# Patient Record
Sex: MALE | Race: BLACK OR AFRICAN AMERICAN | NOT HISPANIC OR LATINO | URBAN - METROPOLITAN AREA
[De-identification: names, ages, dates, MRNs, and addresses within clinical notes are randomized per-mention and may not be internally consistent; named-entity substitution may affect disease eponyms.]

---

## 2020-01-07 ENCOUNTER — HISTORICAL (OUTPATIENT)
Dept: CARDIOLOGY | Facility: HOSPITAL | Age: 45
End: 2020-01-07

## 2020-03-05 ENCOUNTER — HISTORICAL (OUTPATIENT)
Dept: RADIOLOGY | Facility: HOSPITAL | Age: 45
End: 2020-03-05

## 2020-03-12 ENCOUNTER — HISTORICAL (OUTPATIENT)
Dept: RADIOLOGY | Facility: HOSPITAL | Age: 45
End: 2020-03-12

## 2020-05-20 ENCOUNTER — HISTORICAL (OUTPATIENT)
Dept: RADIOLOGY | Facility: HOSPITAL | Age: 45
End: 2020-05-20

## 2021-03-03 ENCOUNTER — HISTORICAL (OUTPATIENT)
Dept: INFECTIOUS DISEASES | Facility: HOSPITAL | Age: 46
End: 2021-03-03

## 2022-08-10 DIAGNOSIS — D44.0 TERATOMA OF UNCERTAIN BEHAVIOR OF THYROID GLAND: Primary | ICD-10-CM

## 2022-10-25 ENCOUNTER — LAB VISIT (OUTPATIENT)
Dept: LAB | Facility: HOSPITAL | Age: 47
End: 2022-10-25
Attending: SURGERY
Payer: MEDICAID

## 2022-10-25 ENCOUNTER — CLINICAL SUPPORT (OUTPATIENT)
Dept: RESPIRATORY THERAPY | Facility: HOSPITAL | Age: 47
End: 2022-10-25
Attending: SURGERY
Payer: MEDICAID

## 2022-10-25 DIAGNOSIS — K62.5 HEMORRHAGE OF RECTUM AND ANUS: Primary | ICD-10-CM

## 2022-10-25 DIAGNOSIS — K62.5 HEMORRHAGE OF RECTUM AND ANUS: ICD-10-CM

## 2022-10-25 LAB
ALBUMIN SERPL-MCNC: 4 GM/DL (ref 3.5–5)
ALBUMIN/GLOB SERPL: 1.1 RATIO (ref 1.1–2)
ALP SERPL-CCNC: 82 UNIT/L (ref 40–150)
ALT SERPL-CCNC: 29 UNIT/L (ref 0–55)
APTT PPP: 26.4 SECONDS (ref 23.2–33.7)
AST SERPL-CCNC: 18 UNIT/L (ref 5–34)
BASOPHILS # BLD AUTO: 0.02 X10(3)/MCL (ref 0–0.2)
BASOPHILS NFR BLD AUTO: 0.3 %
BILIRUBIN DIRECT+TOT PNL SERPL-MCNC: 0.3 MG/DL
BUN SERPL-MCNC: 12 MG/DL (ref 8.9–20.6)
CALCIUM SERPL-MCNC: 9.7 MG/DL (ref 8.4–10.2)
CHLORIDE SERPL-SCNC: 103 MMOL/L (ref 98–107)
CO2 SERPL-SCNC: 28 MMOL/L (ref 22–29)
CREAT SERPL-MCNC: 0.96 MG/DL (ref 0.73–1.18)
EOSINOPHIL # BLD AUTO: 0.3 X10(3)/MCL (ref 0–0.9)
EOSINOPHIL NFR BLD AUTO: 4.3 %
ERYTHROCYTE [DISTWIDTH] IN BLOOD BY AUTOMATED COUNT: 13.6 % (ref 11.5–17)
GFR SERPLBLD CREATININE-BSD FMLA CKD-EPI: >60 MLS/MIN/1.73/M2
GLOBULIN SER-MCNC: 3.5 GM/DL (ref 2.4–3.5)
GLUCOSE SERPL-MCNC: 166 MG/DL (ref 74–100)
HCT VFR BLD AUTO: 41.6 % (ref 42–52)
HGB BLD-MCNC: 14.4 GM/DL (ref 14–18)
IMM GRANULOCYTES # BLD AUTO: 0.03 X10(3)/MCL (ref 0–0.04)
IMM GRANULOCYTES NFR BLD AUTO: 0.4 %
INR BLD: 0.98 (ref 0–1.3)
LYMPHOCYTES # BLD AUTO: 2.33 X10(3)/MCL (ref 0.6–4.6)
LYMPHOCYTES NFR BLD AUTO: 33.3 %
MCH RBC QN AUTO: 31.2 PG (ref 27–31)
MCHC RBC AUTO-ENTMCNC: 34.6 MG/DL (ref 33–36)
MCV RBC AUTO: 90.2 FL (ref 80–94)
MONOCYTES # BLD AUTO: 0.41 X10(3)/MCL (ref 0.1–1.3)
MONOCYTES NFR BLD AUTO: 5.9 %
NEUTROPHILS # BLD AUTO: 3.9 X10(3)/MCL (ref 2.1–9.2)
NEUTROPHILS NFR BLD AUTO: 55.8 %
PLATELET # BLD AUTO: 249 X10(3)/MCL (ref 130–400)
PMV BLD AUTO: 9.4 FL (ref 7.4–10.4)
POTASSIUM SERPL-SCNC: 3.8 MMOL/L (ref 3.5–5.1)
PROT SERPL-MCNC: 7.5 GM/DL (ref 6.4–8.3)
PROTHROMBIN TIME: 12.9 SECONDS (ref 12.5–14.5)
RBC # BLD AUTO: 4.61 X10(6)/MCL (ref 4.7–6.1)
SODIUM SERPL-SCNC: 140 MMOL/L (ref 136–145)
WBC # SPEC AUTO: 7 X10(3)/MCL (ref 4.5–11.5)

## 2022-10-25 PROCEDURE — 93010 EKG 12-LEAD: ICD-10-PCS | Mod: ,,, | Performed by: INTERNAL MEDICINE

## 2022-10-25 PROCEDURE — 93010 ELECTROCARDIOGRAM REPORT: CPT | Mod: ,,, | Performed by: INTERNAL MEDICINE

## 2022-10-25 PROCEDURE — 80053 COMPREHEN METABOLIC PANEL: CPT

## 2022-10-25 PROCEDURE — 93005 ELECTROCARDIOGRAM TRACING: CPT

## 2022-10-25 PROCEDURE — 85025 COMPLETE CBC W/AUTO DIFF WBC: CPT

## 2022-10-25 PROCEDURE — 36415 COLL VENOUS BLD VENIPUNCTURE: CPT

## 2022-10-25 PROCEDURE — 85730 THROMBOPLASTIN TIME PARTIAL: CPT

## 2022-10-25 PROCEDURE — 85610 PROTHROMBIN TIME: CPT

## 2022-10-25 RX ORDER — PRAVASTATIN SODIUM 40 MG/1
40 TABLET ORAL NIGHTLY
COMMUNITY
Start: 2022-09-26

## 2022-10-25 RX ORDER — TIZANIDINE 4 MG/1
TABLET ORAL
COMMUNITY
Start: 2022-10-04

## 2022-10-25 RX ORDER — HYDROCODONE BITARTRATE AND ACETAMINOPHEN 10; 325 MG/1; MG/1
TABLET ORAL
COMMUNITY
Start: 2022-10-04

## 2022-10-25 RX ORDER — HYDROCHLOROTHIAZIDE 25 MG/1
25 TABLET ORAL EVERY MORNING
COMMUNITY
Start: 2022-09-26

## 2022-10-25 RX ORDER — OLMESARTAN MEDOXOMIL 40 MG/1
40 TABLET ORAL EVERY MORNING
COMMUNITY
Start: 2022-09-26

## 2022-10-25 RX ORDER — AMLODIPINE BESYLATE 10 MG/1
10 TABLET ORAL EVERY MORNING
COMMUNITY
Start: 2022-09-26

## 2022-10-25 RX ORDER — ASPIRIN 81 MG/1
81 TABLET ORAL EVERY MORNING
COMMUNITY
Start: 2022-08-09

## 2022-10-25 RX ORDER — LORATADINE 10 MG/1
10 TABLET ORAL EVERY MORNING
COMMUNITY
Start: 2022-09-22

## 2022-10-25 RX ORDER — LISINOPRIL 20 MG/1
20 TABLET ORAL EVERY MORNING
COMMUNITY
Start: 2022-10-08

## 2022-10-25 NOTE — DISCHARGE INSTRUCTIONS
Follow prep on Wednesday. Nothing to drink after midnight. Take Lisinopril, Amlodipine and Olmesartan AM of procedure with small sip of water.

## 2022-10-26 ENCOUNTER — ANESTHESIA EVENT (OUTPATIENT)
Dept: SURGERY | Facility: HOSPITAL | Age: 47
End: 2022-10-26
Payer: MEDICAID

## 2022-10-26 NOTE — ANESTHESIA PREPROCEDURE EVALUATION
10/26/2022  Arnaldo Gomes is a 46 y.o., male.      Pre-op Assessment    I have reviewed the Patient Summary Reports.     I have reviewed the Nursing Notes. I have reviewed the NPO Status.   I have reviewed the Medications.     Review of Systems  Anesthesia Hx:  Denies Family Hx of Anesthesia complications.   Denies Personal Hx of Anesthesia complications.   Social:  Smoker, No Alcohol Use    Hematology/Oncology:  Hematology Normal   Oncology Normal     EENT/Dental:EENT/Dental Normal   Cardiovascular:   Hypertension, well controlled ECG has been reviewed.    Pulmonary:  Pulmonary Normal    Renal/:  Renal/ Normal     Hepatic/GI:  Hepatic/GI Normal    Musculoskeletal:  Musculoskeletal Normal    Neurological:  Neurology Normal    Endocrine:   Diabetes, well controlled, type 2    Dermatological:  Skin Normal    Psych:  Psychiatric Normal           Physical Exam  General: Cooperative, Alert and Oriented    Airway:  Mallampati: II   Mouth Opening: Normal  TM Distance: Normal  Neck ROM: Normal ROM    Dental:  Intact        Anesthesia Plan  Type of Anesthesia, risks & benefits discussed:    Anesthesia Type: Gen Natural Airway  Intra-op Monitoring Plan: Standard ASA Monitors  Post Op Pain Control Plan:   (medical reason for not using multimodal pain management)  Induction:  IV  Informed Consent: Patient consented to blood products? Yes  ASA Score: 3    Ready For Surgery From Anesthesia Perspective.     .

## 2022-10-27 ENCOUNTER — ANESTHESIA (OUTPATIENT)
Dept: SURGERY | Facility: HOSPITAL | Age: 47
End: 2022-10-27
Payer: MEDICAID

## 2022-10-27 ENCOUNTER — HOSPITAL ENCOUNTER (OUTPATIENT)
Facility: HOSPITAL | Age: 47
Discharge: HOME | End: 2022-10-27
Attending: SURGERY | Admitting: SURGERY
Payer: MEDICAID

## 2022-10-27 VITALS
WEIGHT: 262 LBS | BODY MASS INDEX: 34.72 KG/M2 | SYSTOLIC BLOOD PRESSURE: 152 MMHG | RESPIRATION RATE: 20 BRPM | HEART RATE: 83 BPM | TEMPERATURE: 98 F | OXYGEN SATURATION: 97 % | HEIGHT: 73 IN | DIASTOLIC BLOOD PRESSURE: 94 MMHG

## 2022-10-27 DIAGNOSIS — Z12.11 SCREENING FOR COLON CANCER: ICD-10-CM

## 2022-10-27 LAB — POCT GLUCOSE: 104 MG/DL (ref 70–110)

## 2022-10-27 PROCEDURE — 25000003 PHARM REV CODE 250: Performed by: NURSE ANESTHETIST, CERTIFIED REGISTERED

## 2022-10-27 PROCEDURE — 45378 DIAGNOSTIC COLONOSCOPY: CPT | Performed by: SURGERY

## 2022-10-27 PROCEDURE — 37000008 HC ANESTHESIA 1ST 15 MINUTES: Performed by: SURGERY

## 2022-10-27 PROCEDURE — 00811 ANES LWR INTST NDSC NOS: CPT | Performed by: SURGERY

## 2022-10-27 PROCEDURE — 37000009 HC ANESTHESIA EA ADD 15 MINS: Performed by: SURGERY

## 2022-10-27 PROCEDURE — 63600175 PHARM REV CODE 636 W HCPCS: Performed by: ANESTHESIOLOGY

## 2022-10-27 PROCEDURE — 63600175 PHARM REV CODE 636 W HCPCS: Performed by: NURSE ANESTHETIST, CERTIFIED REGISTERED

## 2022-10-27 RX ORDER — PROPOFOL 10 MG/ML
VIAL (ML) INTRAVENOUS
Status: DISCONTINUED | OUTPATIENT
Start: 2022-10-27 | End: 2022-10-27

## 2022-10-27 RX ORDER — LIDOCAINE HYDROCHLORIDE 20 MG/ML
INJECTION, SOLUTION EPIDURAL; INFILTRATION; INTRACAUDAL; PERINEURAL
Status: DISCONTINUED | OUTPATIENT
Start: 2022-10-27 | End: 2022-10-27

## 2022-10-27 RX ORDER — SODIUM CHLORIDE, SODIUM LACTATE, POTASSIUM CHLORIDE, CALCIUM CHLORIDE 600; 310; 30; 20 MG/100ML; MG/100ML; MG/100ML; MG/100ML
INJECTION, SOLUTION INTRAVENOUS CONTINUOUS
Status: DISCONTINUED | OUTPATIENT
Start: 2022-10-27 | End: 2022-10-27 | Stop reason: HOSPADM

## 2022-10-27 RX ADMIN — PROPOFOL 30 MG: 10 INJECTION, EMULSION INTRAVENOUS at 10:10

## 2022-10-27 RX ADMIN — PROPOFOL 150 MG: 10 INJECTION, EMULSION INTRAVENOUS at 10:10

## 2022-10-27 RX ADMIN — PROPOFOL 50 MG: 10 INJECTION, EMULSION INTRAVENOUS at 10:10

## 2022-10-27 RX ADMIN — SODIUM CHLORIDE, POTASSIUM CHLORIDE, SODIUM LACTATE AND CALCIUM CHLORIDE: 600; 310; 30; 20 INJECTION, SOLUTION INTRAVENOUS at 09:10

## 2022-10-27 RX ADMIN — LIDOCAINE HYDROCHLORIDE 60 MG: 20 INJECTION, SOLUTION EPIDURAL; INFILTRATION; INTRACAUDAL; PERINEURAL at 10:10

## 2022-10-27 NOTE — OP NOTE
[facility] - Periop Services  Operative Note      Date of Procedure: 10/27/2022     Procedure: Procedure(s) (LRB):  COLONOSCOPY (N/A)     Surgeon(s) and Role:     * Miguel Baer MD - Primary    Assisting Surgeon: None    Pre-Operative Diagnosis: Rectal bleeding [K62.5]    Post-Operative Diagnosis: Post-Op Diagnosis Codes:     * Rectal bleeding [K62.5]  1. Normal cecum ascending colon transverse colon descending colon  2. Mild diverticular disease of sigmoid colon   3. Grade 2-3 internal hemorrhoids most likely source of bleeding    Anesthesia: General/MAC    Operative Findings (including complications, if any):  Patient is a 46-year-old  male with a history of type 2 diabetes hypertension hypercholesterolemia and morbidly obese at 6 ft 1270 lb.  Patient smokes about a half pack a day for 10 years quit about a year ago.  Patient was referred to me for age-appropriate screening colonoscopy.    Patient was brought to the GI suite underwent sedation and examination of colon all the way to the cecum.  Cecum ascending colon transverse colon descending colon were normal.  Patient is sigmoid had some diverticulosis associated with it.  Grade 2-3 internal hemorrhoids were seen no other pathology was visualized.  Overall the patient did very well had no problems or difficulties was awakened and sent to recovery in good condition.      Plan  1. Discussion of colonoscopy results  2. Follow-up in 2 years recheck stools for blood   3. Follow-up in 10 years repeat screening colonoscopy  4. Discussed possible surgical versus medical treatment of hemorrhoids that is causing the rectal bleeding        Description of Technical Procedures:  Noted above    Significant Surgical Tasks Conducted by the Assistant(s), if Applicable:  None       Estimated Blood Loss (EBL): * No values recorded between 10/27/2022 10:13 AM and 10/27/2022 10:45 AM *           Implants: * No implants in log *    Specimens:    Specimen (24h ago, onward)      None                    Condition: Good    Disposition: PACU - hemodynamically stable.    Attestation: I was present and scrubbed for the entire procedure.    Discharge Note    OUTCOME: Patient tolerated treatment/procedure well without complication and is now ready for discharge.    DISPOSITION: Home or Self Care    FINAL DIAGNOSIS:  Screening for colon cancer    FOLLOWUP: In clinic 1 week    DISCHARGE INSTRUCTIONS:  No discharge procedures on file.

## 2022-10-27 NOTE — DISCHARGE SUMMARY
[facility] - Peri Services  Discharge Note  Short Stay    Procedure(s) (LRB):  COLONOSCOPY (N/A)      OUTCOME:  Good  Disposition to home    FINAL DIAGNOSIS:  Screening for colon cancer    FOLLOWUP:  1 week    DISCHARGE INSTRUCTIONS:  No discharge procedures on file.     TIME SPENT ON DISCHARGE: 5 minutes

## 2022-10-27 NOTE — ANESTHESIA POSTPROCEDURE EVALUATION
Anesthesia Post Evaluation    Patient: Arnaldo Minix    Procedure(s) Performed: Procedure(s) (LRB):  COLONOSCOPY (N/A)    Final Anesthesia Type: general      Patient participation: Yes- Able to Participate  Level of consciousness: awake and alert and oriented  Post-procedure vital signs: reviewed and stable  Pain management: adequate  Airway patency: patent    PONV status at discharge: No PONV  Anesthetic complications: no      Cardiovascular status: stable  Respiratory status: unassisted, spontaneous ventilation and room air  Hydration status: euvolemic  Follow-up not needed.          Vitals Value Taken Time   /80 10/27/22 0903   Temp 36.8 °C (98.2 °F) 10/27/22 0903   Pulse 96 10/27/22 0903   Resp 24 10/27/22 1045   SpO2 97 % 10/27/22 0903         No case tracking events are documented in the log.      Pain/Luba Score: No data recorded

## 2024-01-24 ENCOUNTER — TELEPHONE (OUTPATIENT)
Dept: CARDIOLOGY | Facility: CLINIC | Age: 49
End: 2024-01-24
Payer: MEDICAID

## 2024-01-24 NOTE — TELEPHONE ENCOUNTER
Called and lt vm informing pt that we received a referral from [facility] to schedule an apt. Requested pt to call back.

## 2024-04-16 ENCOUNTER — OFFICE VISIT (OUTPATIENT)
Dept: CARDIOLOGY | Facility: CLINIC | Age: 49
End: 2024-04-16
Payer: COMMERCIAL

## 2024-04-16 VITALS
RESPIRATION RATE: 20 BRPM | SYSTOLIC BLOOD PRESSURE: 100 MMHG | TEMPERATURE: 99 F | HEART RATE: 84 BPM | WEIGHT: 251.63 LBS | OXYGEN SATURATION: 98 % | HEIGHT: 73 IN | BODY MASS INDEX: 33.35 KG/M2 | DIASTOLIC BLOOD PRESSURE: 62 MMHG

## 2024-04-16 DIAGNOSIS — E78.5 HYPERLIPIDEMIA, UNSPECIFIED HYPERLIPIDEMIA TYPE: ICD-10-CM

## 2024-04-16 DIAGNOSIS — R07.9 CHEST PAIN, UNSPECIFIED TYPE: Primary | ICD-10-CM

## 2024-04-16 DIAGNOSIS — F17.200 SMOKING: ICD-10-CM

## 2024-04-16 DIAGNOSIS — I10 HYPERTENSION, UNSPECIFIED TYPE: ICD-10-CM

## 2024-04-16 PROCEDURE — 99215 OFFICE O/P EST HI 40 MIN: CPT | Mod: PBBFAC,25 | Performed by: INTERNAL MEDICINE

## 2024-04-16 PROCEDURE — 93005 ELECTROCARDIOGRAM TRACING: CPT

## 2024-04-16 RX ORDER — LOSARTAN POTASSIUM 100 MG/1
100 TABLET ORAL DAILY
COMMUNITY
Start: 2024-04-07

## 2024-04-16 RX ORDER — TADALAFIL 5 MG/1
5 TABLET ORAL DAILY PRN
COMMUNITY
Start: 2024-02-10

## 2024-04-16 RX ORDER — OXYCODONE AND ACETAMINOPHEN 7.5; 325 MG/1; MG/1
1 TABLET ORAL
COMMUNITY
Start: 2024-03-19

## 2024-04-16 RX ORDER — NITROGLYCERIN 0.4 MG/1
0.4 TABLET SUBLINGUAL EVERY 5 MIN PRN
Qty: 30 TABLET | Refills: 2 | Status: SHIPPED | OUTPATIENT
Start: 2024-04-16 | End: 2025-04-16

## 2024-04-16 NOTE — PROGRESS NOTES
[facility]  Cariology Clinic - New Patient     Cardiology Attending: Dr. Terry  Date of Visit: 2024  Reason for Visit/Chief Complaint:   Chief Complaint    initial visit sts has chest pain every other day with sob n          History of Present Illness:      Arnaldo Gomes is a 48 y.o. male with a PMH significant for HTN, HLD, tobacco use, DM-2, NATALY (not on CPAP), ED who presents to cardiology clinic as a new patient for chest pain.  Previously followed with [facility] and referred here due a change in his insurance.  He reports several months of recurrent chest pain episodes which occur at random.  2-3 episodes per week and they last for 10 minutes or so.  They occur at rest or with stressful situations.  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  He has never had chest pain at work.  Chest pain in mid sternal and pressure like.  Associated with shortness of breath, diaphoresis and right arm numbness.  In  he had an unrevealing TTE and a coronary calcium artery score of 1.  A stress test was ordered but not completed due to the insurance change.  His father  of MI at the age of 65.  He smoked 1 pack per day and has a 25 pack year history.  BP well controlled.  He states PCP took him off of diabetic meds because his sugars have improved.  Not sure what his cholesterol levels are.    Past Medical History:     Past Medical History:   Diagnosis Date    Back pain     DM (diabetes mellitus)     High cholesterol     HTN (hypertension)      Surgical History:     Past Surgical History:   Procedure Laterality Date    COLONOSCOPY N/A 10/27/2022    Procedure: COLONOSCOPY;  Surgeon: Miguel Baer MD;  Location: [facility];  Service: Endoscopy;  Laterality: N/A;  post op: normal colon, grade II-III hemorrhoids      Family History:     Family History   Problem Relation Name Age of Onset    Hypertension Mother      Hypertension  Father       Social History:     Social History     Tobacco Use    Smoking status: Every Day     Current packs/day: 1.00     Types: Cigarettes    Smokeless tobacco: Never   Substance Use Topics    Alcohol use: Not Currently     Allergies:     Review of patient's allergies indicates:   Allergen Reactions    Shellfish containing products Itching     Allergic:crawfish - itching / swelling     Medications:     Current Outpatient Medications   Medication Sig Dispense Refill    amLODIPine (NORVASC) 10 MG tablet Take 10 mg by mouth every morning.      aspirin (ECOTRIN) 81 MG EC tablet Take 81 mg by mouth every morning. Stopped 10/26/22      hydroCHLOROthiazide (HYDRODIURIL) 25 MG tablet Take 25 mg by mouth every morning.      lisinopriL (PRINIVIL,ZESTRIL) 20 MG tablet Take 20 mg by mouth every morning.      loratadine (CLARITIN) 10 mg tablet Take 10 mg by mouth every morning.      losartan (COZAAR) 100 MG tablet Take 100 mg by mouth once daily.      olmesartan (BENICAR) 40 MG tablet Take 40 mg by mouth every morning.      oxyCODONE-acetaminophen (PERCOCET) 7.5-325 mg per tablet Take 1 tablet by mouth every 4 to 6 hours as needed.      pravastatin (PRAVACHOL) 40 MG tablet Take 40 mg by mouth every evening.      tadalafiL (CIALIS) 5 MG tablet Take 5 mg by mouth daily as needed.      tiZANidine (ZANAFLEX) 4 MG tablet TAKE ONE TABLET BY MOUTH THREE TIMES DAILY FOR FOR BACK SPASMS      HYDROcodone-acetaminophen (NORCO)  mg per tablet TAKE ONE TABLET BY MOUTH EVERY 4 HOURS AROUND THE CLOCK (Patient not taking: Reported on 4/16/2024)       No current facility-administered medications for this visit.       I have reviewed and updated the patient's medications, allergies, past medical history, surgical history, social history and family history as needed.    Review of Systems:     Review of Systems   Constitutional:  Positive for diaphoresis and malaise/fatigue. Negative for chills and fever.   HENT:  Negative for hearing  "loss and sore throat.    Eyes:  Negative for blurred vision, pain and redness.   Respiratory:  Positive for shortness of breath. Negative for cough and wheezing.    Cardiovascular:  Positive for chest pain. Negative for palpitations, orthopnea, leg swelling and PND.   Gastrointestinal:  Negative for abdominal pain, constipation, diarrhea, nausea and vomiting.   Musculoskeletal:  Negative for back pain, joint pain and myalgias.   Neurological:  Negative for dizziness, sensory change and focal weakness.      Objective:     Wt Readings from Last 3 Encounters:   04/16/24 114.1 kg (251 lb 9.6 oz)   10/25/22 118.8 kg (262 lb)     Temp Readings from Last 3 Encounters:   04/16/24 98.6 °F (37 °C) (Oral)   10/27/22 98.2 °F (36.8 °C) (Oral)     BP Readings from Last 3 Encounters:   04/16/24 100/62   10/27/22 (!) 152/94     Pulse Readings from Last 3 Encounters:   04/16/24 84   10/27/22 83       Vitals:    04/16/24 1125   BP: 100/62   BP Location: Left arm   Patient Position: Sitting   BP Method: Large (Automatic)   Pulse: 84   Resp: 20   Temp: 98.6 °F (37 °C)   TempSrc: Oral   SpO2: 98%   Weight: 114.1 kg (251 lb 9.6 oz)   Height: 6' 1" (1.854 m)     Body mass index is 33.19 kg/m².    Physical Exam  Constitutional:       General: He is not in acute distress.  HENT:      Head: Normocephalic and atraumatic.   Eyes:      General: No scleral icterus.  Neck:      Vascular: No carotid bruit.   Cardiovascular:      Rate and Rhythm: Normal rate and regular rhythm.      Heart sounds: No murmur heard.  Pulmonary:      Effort: Pulmonary effort is normal.      Breath sounds: No wheezing, rhonchi or rales.   Abdominal:      General: There is no distension.      Palpations: Abdomen is soft.      Tenderness: There is no abdominal tenderness.   Musculoskeletal:      Right lower leg: No edema.      Left lower leg: No edema.   Neurological:      General: No focal deficit present.      Mental Status: He is alert and oriented to person, place, " "and time.          Labs:   I have reviewed the following labs below:      Lab Results   Component Value Date    WBC 7.0 10/25/2022    HGB 14.4 10/25/2022    HCT 41.6 (L) 10/25/2022     10/25/2022    MCV 90.2 10/25/2022    RDW 13.6 10/25/2022     Lab Results   Component Value Date     10/25/2022    K 3.8 10/25/2022     05/14/2020    CO2 28 10/25/2022    BUN 12.0 10/25/2022    CALCIUM 9.7 10/25/2022     Lab Results   Component Value Date    ALBUMIN 4.0 10/25/2022    BILITOT 0.3 10/25/2022    AST 18 10/25/2022    ALKPHOS 82 10/25/2022    ALT 29 10/25/2022     No results found for: "CHOL", "HDL", "LDL", "TRIG", "NONHDLC", "LDLCALC", "LDLDIRECT", "LDLHDL", "CHOLHDL"  Lab Results   Component Value Date    HGBA1C 12.6 (H) 05/11/2020    CREATININE 0.96 10/25/2022     No results found for: "TSH", "FREET4", "V3FDYYQ", "N8CUDCQ", "THYROIDAB"  No results found for: "IRON", "TIBC", "FERRITIN", "LPXEVRPV63", "FOLATE"  Lab Results   Component Value Date    INR 0.98 10/25/2022    PROTIME 12.9 10/25/2022      Lab Results   Component Value Date    TROPONINI 0.01 05/11/2020     Cardiac Studies/Imaging:   I have reviewed the following studies below:      EKG (4/16/2024):  NSR, unremarkable    TTE (3/2020):  LVEF 65%, LVH, negative bubble study, mild PI      Assessment/Plan:   48 y.o. male with TN, HLD, tobacco use, DM-2, NATALY (not on CPAP), ED who presents to cardiology clinic as a new patient for chest pain.    Plan:  Possible cardiac chest pain - will obtain treadmill stress test  - continue asa and statin  - BP well controlled - cont hctz, losartan, norvasc  - smoking cessation - interested in cessation education - referred  - labs per PCP  - diabetes management per PCP    Return to clinic in 3 months.    Jimmie Rodas MD  [facility] Cardiology Fellow, PGY-5  04/16/2024 12:02 PM         "

## 2024-04-17 LAB
OHS QRS DURATION: 92 MS
OHS QTC CALCULATION: 448 MS

## 2024-06-10 ENCOUNTER — TELEPHONE (OUTPATIENT)
Dept: SMOKING CESSATION | Facility: CLINIC | Age: 49
End: 2024-06-10
Payer: COMMERCIAL

## 2024-06-17 ENCOUNTER — TELEPHONE (OUTPATIENT)
Dept: CARDIOLOGY | Facility: CLINIC | Age: 49
End: 2024-06-17
Payer: COMMERCIAL

## 2024-06-17 NOTE — TELEPHONE ENCOUNTER
Left voicemail informing pt that apt for 7/22/24 had been rescheduled on 7/25/24 at 10am due to provider will be out of clinic on 7/22/24. Also, put updated apt letter to be mailed out.

## 2024-07-16 DIAGNOSIS — G47.33 OSA (OBSTRUCTIVE SLEEP APNEA): Primary | ICD-10-CM

## 2024-11-03 ENCOUNTER — HOSPITAL ENCOUNTER (EMERGENCY)
Facility: HOSPITAL | Age: 49
Discharge: HOME | End: 2024-11-03
Attending: EMERGENCY MEDICINE
Payer: COMMERCIAL

## 2024-11-03 VITALS
HEART RATE: 74 BPM | SYSTOLIC BLOOD PRESSURE: 130 MMHG | WEIGHT: 262 LBS | BODY MASS INDEX: 35.49 KG/M2 | OXYGEN SATURATION: 98 % | DIASTOLIC BLOOD PRESSURE: 84 MMHG | HEIGHT: 72 IN | TEMPERATURE: 96 F | RESPIRATION RATE: 18 BRPM

## 2024-11-03 DIAGNOSIS — J40 BRONCHITIS: ICD-10-CM

## 2024-11-03 DIAGNOSIS — R05.9 COUGH IN ADULT PATIENT: ICD-10-CM

## 2024-11-03 DIAGNOSIS — R05.9 COUGH IN ADULT: ICD-10-CM

## 2024-11-03 DIAGNOSIS — R55 VASOVAGAL NEAR SYNCOPE: Primary | ICD-10-CM

## 2024-11-03 LAB
ALBUMIN SERPL-MCNC: 4.2 G/DL (ref 3.5–5)
ALBUMIN/GLOB SERPL: 1 RATIO (ref 1.1–2)
ALP SERPL-CCNC: 78 UNIT/L (ref 40–150)
ALT SERPL-CCNC: 31 UNIT/L (ref 0–55)
ANION GAP SERPL CALC-SCNC: 8 MEQ/L
AST SERPL-CCNC: 24 UNIT/L (ref 5–34)
BASOPHILS # BLD AUTO: 0.03 X10(3)/MCL
BASOPHILS NFR BLD AUTO: 0.4 %
BILIRUB SERPL-MCNC: 0.3 MG/DL
BUN SERPL-MCNC: 12 MG/DL (ref 8.9–20.6)
CALCIUM SERPL-MCNC: 10.2 MG/DL (ref 8.4–10.2)
CHLORIDE SERPL-SCNC: 106 MMOL/L (ref 98–107)
CO2 SERPL-SCNC: 25 MMOL/L (ref 22–29)
CREAT SERPL-MCNC: 0.92 MG/DL (ref 0.72–1.25)
CREAT/UREA NIT SERPL: 13
EOSINOPHIL # BLD AUTO: 0.17 X10(3)/MCL (ref 0–0.9)
EOSINOPHIL NFR BLD AUTO: 2.4 %
ERYTHROCYTE [DISTWIDTH] IN BLOOD BY AUTOMATED COUNT: 14.3 % (ref 11.5–17)
EST. AVERAGE GLUCOSE BLD GHB EST-MCNC: 114 MG/DL
FLUAV AG UPPER RESP QL IA.RAPID: NOT DETECTED
FLUBV AG UPPER RESP QL IA.RAPID: NOT DETECTED
GFR SERPLBLD CREATININE-BSD FMLA CKD-EPI: >60 ML/MIN/1.73/M2
GLOBULIN SER-MCNC: 4.2 GM/DL (ref 2.4–3.5)
GLUCOSE SERPL-MCNC: 87 MG/DL (ref 74–100)
HBA1C MFR BLD: 5.6 %
HCT VFR BLD AUTO: 43 % (ref 42–52)
HGB BLD-MCNC: 15 G/DL (ref 14–18)
IMM GRANULOCYTES # BLD AUTO: 0.03 X10(3)/MCL (ref 0–0.04)
IMM GRANULOCYTES NFR BLD AUTO: 0.4 %
LYMPHOCYTES # BLD AUTO: 2.91 X10(3)/MCL (ref 0.6–4.6)
LYMPHOCYTES NFR BLD AUTO: 41.6 %
MCH RBC QN AUTO: 31.3 PG (ref 27–31)
MCHC RBC AUTO-ENTMCNC: 34.9 G/DL (ref 33–36)
MCV RBC AUTO: 89.6 FL (ref 80–94)
MONOCYTES # BLD AUTO: 0.64 X10(3)/MCL (ref 0.1–1.3)
MONOCYTES NFR BLD AUTO: 9.1 %
NEUTROPHILS # BLD AUTO: 3.22 X10(3)/MCL (ref 2.1–9.2)
NEUTROPHILS NFR BLD AUTO: 46.1 %
PLATELET # BLD AUTO: 287 X10(3)/MCL (ref 130–400)
PMV BLD AUTO: 9.2 FL (ref 7.4–10.4)
POTASSIUM SERPL-SCNC: 3.8 MMOL/L (ref 3.5–5.1)
PROT SERPL-MCNC: 8.4 GM/DL (ref 6.4–8.3)
RBC # BLD AUTO: 4.8 X10(6)/MCL (ref 4.7–6.1)
RSV A 5' UTR RNA NPH QL NAA+PROBE: NOT DETECTED
SARS-COV-2 RNA RESP QL NAA+PROBE: NOT DETECTED
SODIUM SERPL-SCNC: 139 MMOL/L (ref 136–145)
WBC # BLD AUTO: 7 X10(3)/MCL (ref 4.5–11.5)

## 2024-11-03 PROCEDURE — 93005 ELECTROCARDIOGRAM TRACING: CPT

## 2024-11-03 PROCEDURE — 99900031 HC PATIENT EDUCATION (STAT)

## 2024-11-03 PROCEDURE — 25000242 PHARM REV CODE 250 ALT 637 W/ HCPCS: Performed by: EMERGENCY MEDICINE

## 2024-11-03 PROCEDURE — 80053 COMPREHEN METABOLIC PANEL: CPT | Performed by: EMERGENCY MEDICINE

## 2024-11-03 PROCEDURE — 93010 ELECTROCARDIOGRAM REPORT: CPT | Mod: ,,, | Performed by: INTERNAL MEDICINE

## 2024-11-03 PROCEDURE — 85025 COMPLETE CBC W/AUTO DIFF WBC: CPT | Performed by: EMERGENCY MEDICINE

## 2024-11-03 PROCEDURE — 83036 HEMOGLOBIN GLYCOSYLATED A1C: CPT | Performed by: EMERGENCY MEDICINE

## 2024-11-03 PROCEDURE — 25000003 PHARM REV CODE 250: Performed by: EMERGENCY MEDICINE

## 2024-11-03 PROCEDURE — 0241U COVID/RSV/FLU A&B PCR: CPT | Performed by: EMERGENCY MEDICINE

## 2024-11-03 PROCEDURE — 94761 N-INVAS EAR/PLS OXIMETRY MLT: CPT

## 2024-11-03 PROCEDURE — 94640 AIRWAY INHALATION TREATMENT: CPT

## 2024-11-03 PROCEDURE — 99285 EMERGENCY DEPT VISIT HI MDM: CPT | Mod: 25

## 2024-11-03 RX ORDER — GUAIFENESIN AND DEXTROMETHORPHAN HYDROBROMIDE 1200; 60 MG/1; MG/1
1 TABLET, EXTENDED RELEASE ORAL 2 TIMES DAILY
Qty: 20 TABLET | Refills: 0 | Status: SHIPPED | OUTPATIENT
Start: 2024-11-03 | End: 2024-11-13

## 2024-11-03 RX ORDER — METHYLPHENIDATE HYDROCHLORIDE 5 MG/1
5 TABLET ORAL 2 TIMES DAILY WITH MEALS
Status: DISCONTINUED | OUTPATIENT
Start: 2024-11-03 | End: 2024-11-03

## 2024-11-03 RX ORDER — DOCUSATE SODIUM 100 MG
400 CAPSULE ORAL ONCE
Status: COMPLETED | OUTPATIENT
Start: 2024-11-03 | End: 2024-11-03

## 2024-11-03 RX ORDER — IPRATROPIUM BROMIDE AND ALBUTEROL SULFATE 2.5; .5 MG/3ML; MG/3ML
3 SOLUTION RESPIRATORY (INHALATION)
Status: COMPLETED | OUTPATIENT
Start: 2024-11-03 | End: 2024-11-03

## 2024-11-03 RX ADMIN — Medication 400 ML: at 09:11

## 2024-11-03 RX ADMIN — IPRATROPIUM BROMIDE AND ALBUTEROL SULFATE 3 ML: .5; 3 SOLUTION RESPIRATORY (INHALATION) at 08:11

## 2024-11-03 NOTE — DISCHARGE INSTRUCTIONS
Plenty of fluids.     !! REST !!    Bedside vaporizer/humidifier:  Hand-held humidifier would also be helpful throughout the day.    Antibiotics, IF PRESCRIBED: take the full course for the full duration. (Antibiotics are not always indicated in treatment of this problem.    Inhaler as prescribed.    Over-the-counter cough suppressant is encouraged:  Remember, however, full suppression of cough is not a good idea.  The idea is to minimize the cough for comfort.    MUCINEX-DM: take twice daily for SEVEN DAYS: this medication helps to break-up mucus and congestion in the lungs and will certainly facilitate getting well.    Feel free to return to the emergency department for failure to improve over the next 48-72 hours, development of fever, or starting to cough up green/yellow sputum despite the current measures.

## 2024-11-03 NOTE — Clinical Note
"Arnaldo Dupree" Eliseo was seen and treated in our emergency department on 11/3/2024.  He may return to work on 11/05/2024.       If you have any questions or concerns, please don't hesitate to call.      Bobby Wilson MD"

## 2024-11-07 LAB
OHS QRS DURATION: 92 MS
OHS QTC CALCULATION: 423 MS

## 2024-11-25 NOTE — ED PROVIDER NOTES
"Encounter Date: 11/3/2024       History     Chief Complaint   Patient presents with    Loss of Consciousness     C/o syncopal episode while at work this morning, pt states that once he started falling he doesn't remember what happened next. Pt states that he has been having congestion, cough, feeling weak, & a little SOB for atleast 2 weeks     This is a 48-year-old male who presents complaining of an episode of swelling and "fainting" this morning while at work.  He says that he has had an antecedent history of cough, feeling weak, some upper and lower respiratory congestion and some mild, exertional shortness of breath for 2 weeks.  He admits to poor p.o. intake: Solids and liquids.  He reports that he had some graying out, he felt lightheaded, had some mild diaphoresis and was actually lower to the floor.  He denies any headache, head pain, neck pain, or neck tenderness.  No antecedent or concomitant history of chest pain, back pain, epigastric abdominal pain.          Review of patient's allergies indicates:   Allergen Reactions    Shellfish containing products Itching     Allergic:crawfish - itching / swelling     Past Medical History:   Diagnosis Date    Back pain     DM (diabetes mellitus)     High cholesterol     HTN (hypertension)      Past Surgical History:   Procedure Laterality Date    COLONOSCOPY N/A 10/27/2022    Procedure: COLONOSCOPY;  Surgeon: Miguel Baer MD;  Location: [facility];  Service: Endoscopy;  Laterality: N/A;  post op: normal colon, grade II-III hemorrhoids      Family History   Problem Relation Name Age of Onset    Hypertension Mother      Hypertension Father       Social History     Tobacco Use    Smoking status: Every Day     Current packs/day: 1.00     Types: Cigarettes    Smokeless tobacco: Never   Substance Use Topics    Alcohol use: Not Currently     Review of Systems   Constitutional:  Negative for fever.   HENT: Negative.     Eyes:  Negative for visual disturbance. "   Respiratory:  Negative for shortness of breath.    Cardiovascular:  Negative for chest pain.   Gastrointestinal:  Negative for nausea.   Endocrine: Negative for polydipsia, polyphagia and polyuria.   Genitourinary:  Negative for dysuria, hematuria and scrotal swelling.   Musculoskeletal:  Negative for back pain.   Skin:  Negative for wound.   Allergic/Immunologic: Negative for immunocompromised state.   Neurological:  Negative for headaches.   Hematological:  Does not bruise/bleed easily.   Psychiatric/Behavioral: Negative.     All other systems reviewed and are negative.      Physical Exam     Initial Vitals [11/03/24 0812]   BP Pulse Resp Temp SpO2   137/87 84 20 96.2 °F (35.7 °C) 96 %      MAP       --         Physical Exam    Nursing note and vitals reviewed.  Constitutional: He appears well-developed and well-nourished. He is not diaphoretic. No distress.   HENT:   Head: Normocephalic and atraumatic. Mouth/Throat: Uvula is midline, oropharynx is clear and moist and mucous membranes are normal.   Eyes: Conjunctivae and EOM are normal. Pupils are equal, round, and reactive to light.   Neck: Neck supple. No Brudzinski's sign and no Kernig's sign noted. Carotid bruit is not present. No JVD present.   Normal range of motion.  Cardiovascular:  Normal rate, regular rhythm, normal heart sounds and intact distal pulses.     Exam reveals no gallop and no friction rub.       No murmur heard.  Pulmonary/Chest: Breath sounds normal. No respiratory distress. He has no wheezes. He has no rhonchi. He has no rales.   Abdominal: Abdomen is soft. Bowel sounds are normal. He exhibits no distension and no mass. There is no abdominal tenderness.   Musculoskeletal:         General: Normal range of motion.      Cervical back: Normal range of motion and neck supple.     Neurological: He is alert and oriented to person, place, and time.   Skin: Skin is warm and dry. No rash noted.   Psychiatric: He has a normal mood and affect. His  behavior is normal. Judgment and thought content normal.         ED Course   Procedures  Labs Reviewed   COMPREHENSIVE METABOLIC PANEL - Abnormal       Result Value    Sodium 139      Potassium 3.8      Chloride 106      CO2 25      Glucose 87      Blood Urea Nitrogen 12.0      Creatinine 0.92      Calcium 10.2      Protein Total 8.4 (*)     Albumin 4.2      Globulin 4.2 (*)     Albumin/Globulin Ratio 1.0 (*)     Bilirubin Total 0.3      ALP 78      ALT 31      AST 24      eGFR >60      Anion Gap 8.0      BUN/Creatinine Ratio 13     CBC WITH DIFFERENTIAL - Abnormal    WBC 7.00      RBC 4.80      Hgb 15.0      Hct 43.0      MCV 89.6      MCH 31.3 (*)     MCHC 34.9      RDW 14.3      Platelet 287      MPV 9.2      Neut % 46.1      Lymph % 41.6      Mono % 9.1      Eos % 2.4      Basophil % 0.4      Lymph # 2.91      Neut # 3.22      Mono # 0.64      Eos # 0.17      Baso # 0.03      IG# 0.03      IG% 0.4     COVID/RSV/FLU A&B PCR - Normal    Influenza A PCR Not Detected      Influenza B PCR Not Detected      Respiratory Syncytial Virus PCR Not Detected      SARS-CoV-2 PCR Not Detected      Narrative:     The Xpert Xpress SARS-CoV-2/FLU/RSV plus is a rapid, multiplexed real-time PCR test intended for the simultaneous qualitative detection and differentiation of SARS-CoV-2, Influenza A, Influenza B, and respiratory syncytial virus (RSV) viral RNA in either nasopharyngeal swab or nasal swab specimens.         CBC W/ AUTO DIFFERENTIAL    Narrative:     The following orders were created for panel order CBC auto differential.  Procedure                               Abnormality         Status                     ---------                               -----------         ------                     CBC with Differential[[id number]]       Abnormal            Final result                 Please view results for these tests on the individual orders.   HEMOGLOBIN A1C    Hemoglobin A1c 5.6      Estimated Average Glucose 114.0           ECG Results              EKG 12-lead (Final result)        Collection Time Result Time QRS Duration OHS QTC Calculation    11/03/24 08:30:14 11/07/24 11:52:34 92 423                     Final result by Interface, Lab In [facility] (11/07/24 11:52:39)                   Narrative:    Test Reason : R05.9,    Vent. Rate : 076 BPM     Atrial Rate : 076 BPM     P-R Int : 160 ms          QRS Dur : 092 ms      QT Int : 376 ms       P-R-T Axes : 066 055 042 degrees     QTc Int : 423 ms    Normal sinus rhythm  Normal ECG  When compared with ECG of 16-APR-2024 11:38,  No significant change was found  Confirmed by Ronnie Pinto MD ([id number]) on 11/7/2024 11:52:32 AM    Referred By: SIRISHAERR   SELF           Confirmed By:Ronnie Pinto MD                                     EKG 12-lead (Final result)  Result time 11/07/24 12:13:45      Final result by Unknown User (11/07/24 12:13:45)                                      Imaging Results              X-Ray Chest PA And Lateral (Final result)  Result time 11/03/24 09:01:22      Final result by Zeeshan Chanel MD (11/03/24 09:01:22)                   Impression:      No acute cardiopulmonary process      Electronically signed by: Zeeshan Chanel MD  Date:    11/03/2024  Time:    09:01               Narrative:    EXAMINATION:  Chest two views    CLINICAL HISTORY:  Shortness of breath    COMPARISON:  02/15/2020    FINDINGS:  Cardiac silhouette is normal size.  Central vessels are within normal limits.  No confluent airspace disease.  No visible pneumothorax or pleural effusion.  No acute osseous abnormality                                       Medications   albuterol-ipratropium 2.5 mg-0.5 mg/3 mL nebulizer solution 3 mL (3 mLs Nebulization Given 11/3/24 3259)   electrolytes-dextrose (Pedialyte) oral solution 400 mL (400 mLs Oral Given 11/3/24 0545)     Medical Decision Making  Medical Decision Making (MDM) components:    Initial clinical assessment performed to include initial history &  physical examination;  Laboratory evaluation and interpretation;  Medical and/or Therapeutic intervention(s);  Medical Record(s) review;  Clinical Re-assessment X (2)  Patient Education;  Coordination of follow-up care.    -ktj-            Amount and/or Complexity of Data Reviewed  Labs: ordered.  Radiology: ordered.    Risk  OTC drugs.  Prescription drug management.                                      Clinical Impression:  Final diagnoses:  [R05.9] Cough in adult patient  [R05.9] Cough in adult  [R05.9] Cough in adult patient - w/ lower resp congestion  [R55] Vasovagal near syncope (Primary)  [J40] Bronchitis          ED Disposition Condition    Discharge Stable          ED Prescriptions       Medication Sig Dispense Start Date End Date Auth. Provider    ipratropium-albuteroL (COMBIVENT)  mcg/actuation inhaler () Inhale 2 puffs into the lungs 2 (two) times daily. Rescue for 5 days 1 each 11/3/2024 2024 Bobby Wilson MD    dextromethorphan-guaiFENesin (MUCINEX DM) 60-1,200 mg per 12 hr tablet () Take 1 tablet by mouth 2 (two) times a day. for 10 days 20 tablet 11/3/2024 2024 Bobby Wilson MD          Follow-up Information       Follow up With Specialties Details Why Contact Delvin Hatch NP Family Medicine In 5 days For FOLLOW-UP for today's problem, As needed, If symptoms worsen, For failure to Improve [address] [place] [id number]  [phone]            Afebrile. Vasomotor Stable. Ambulating in the ED. Pleased with care.  Patient specifically advised, by me, to RETURN to the emergency department for ANY deterioration, detrimental changes, failure to improve, feeling worse and/or ANY concerns whatsoever. Verbalized understanding and agreed to comply.        This treatment record was composed utilizing a combination of typing and the M*Modal Fluency Direct dictation system. Some errors in transcription, wording and spelling are, therefore, to be  expected.       Bobby Wilson MD  12/13/24 3055

## 2025-01-30 ENCOUNTER — HOSPITAL ENCOUNTER (OUTPATIENT)
Dept: RADIOLOGY | Facility: HOSPITAL | Age: 50
Discharge: HOME | End: 2025-01-30
Attending: NURSE PRACTITIONER
Payer: COMMERCIAL

## 2025-01-30 DIAGNOSIS — J32.9 SINUSITIS, UNSPECIFIED CHRONICITY, UNSPECIFIED LOCATION: ICD-10-CM

## 2025-01-30 PROCEDURE — 71046 X-RAY EXAM CHEST 2 VIEWS: CPT | Mod: TC

## 2025-01-30 PROCEDURE — 70220 X-RAY EXAM OF SINUSES: CPT | Mod: TC

## (undated) DEVICE — KIT SURGICAL COLON .25 1.1OZ